# Patient Record
Sex: FEMALE | Race: WHITE
[De-identification: names, ages, dates, MRNs, and addresses within clinical notes are randomized per-mention and may not be internally consistent; named-entity substitution may affect disease eponyms.]

---

## 2019-12-31 ENCOUNTER — HOSPITAL ENCOUNTER (EMERGENCY)
Dept: HOSPITAL 25 - UCEAST | Age: 64
Discharge: HOME | End: 2019-12-31
Payer: COMMERCIAL

## 2019-12-31 VITALS — DIASTOLIC BLOOD PRESSURE: 91 MMHG | SYSTOLIC BLOOD PRESSURE: 144 MMHG

## 2019-12-31 DIAGNOSIS — Z87.891: ICD-10-CM

## 2019-12-31 DIAGNOSIS — L25.0: Primary | ICD-10-CM

## 2019-12-31 PROCEDURE — G0463 HOSPITAL OUTPT CLINIC VISIT: HCPCS

## 2019-12-31 PROCEDURE — 99212 OFFICE O/P EST SF 10 MIN: CPT

## 2019-12-31 NOTE — UC
UC General HPI





- History of Current Complaint


Chief Complaint: UCRash


Stated Complaint: RASH


Hx Obtained From: Patient


Pain Intensity: 4





- Allergy/Home Medications


Allergies/Adverse Reactions: 


 Allergies











Allergy/AdvReac Type Severity Reaction Status Date / Time


 


No Known Allergies Allergy   Verified 12/31/19 08:55














PMH/Surg Hx/FS Hx/Imm Hx





- Surgical History


Surgical History: Yes


Surgery Procedure, Year, and Place: hernia repair.  tonsillectomy





- Family History


Known Family History: Positive: None





- Social History


Alcohol Use: Daily


Substance Use Type: None


Smoking Status (MU): Former Smoker


When Did the Patient Quit Smoking/Using Tobacco: 34 yrs ago





Physical Exam


Vital Signs: 


 Initial Vital Signs











Temp  98 F   12/31/19 08:51


 


Pulse  96   12/31/19 08:51


 


Resp  18   12/31/19 08:51


 


BP  144/91   12/31/19 08:51


 


Pulse Ox  100   12/31/19 08:51














Discharge ED





- Discharge Plan


Referrals: 


Shereen Ramírez MD [Primary Care Provider] -

## 2019-12-31 NOTE — UC
Skin Complaint HPI





- HPI Summary


HPI Summary: 





63 yo female presents with rash. She tells me that for years she has used the 

same deodorant, but it recently went off the market. 4 days ago she tried a new 

deodorant and 2 days later developed a red, burning, itching rash under both of 

her arms. She applied OTC natural oils with no relief. Denies edema, throat 

swelling, sob, wheezing. 





- History of Current Complaint


Chief Complaint: UCRash


Time Seen by Provider: 12/31/19 09:54


Stated Complaint: RASH


Onset/Duration: Sudden Onset


Onset Severity: Mild


Current Severity: Mild


Pain Intensity: 4


Pain Scale Used: 0-10 Numeric





- Allergy/Home Medications


Allergies/Adverse Reactions: 


 Allergies











Allergy/AdvReac Type Severity Reaction Status Date / Time


 


No Known Allergies Allergy   Verified 12/31/19 08:55














PMH/Surg Hx/FS Hx/Imm Hx





- Additional Past Medical History


Additional PMH: 





None





- Surgical History


Surgical History: Yes


Surgery Procedure, Year, and Place: hernia repair.  tonsillectomy





- Family History


Known Family History: Positive: None





- Social History


Lives: With Family


Alcohol Use: Daily


Substance Use Type: None


Smoking Status (MU): Former Smoker


When Did the Patient Quit Smoking/Using Tobacco: 34 yrs ago





Review of Systems


All Other Systems Reviewed And Are Negative: No


Constitutional: Positive: Negative


Skin: Positive: Rash


Eyes: Positive: Negative


ENT: Positive: Negative


Respiratory: Positive: Negative


Cardiovascular: Positive: Negative


Gastrointestinal: Positive: Negative


Neurological: Positive: Negative


Psychological: Positive: Negative





Physical Exam





- Summary


Physical Exam Summary: 





GENERAL: NAD. WDWN. No pain distress.


SKIN: B/L Axilla: mildly erythematous dry appearing irritation. No open wounds, 

drainage, abscess, or streaking. 


NECK: Supple. Nontender. No lymphadenopathy. 


CHEST:  No accessory muscle use. Breathing comfortably and in no distress.


CV:  Pulses intact. Cap refill <2seconds


NEURO: Alert.


PSYCH: Age appropriate behavior.





Triage Information Reviewed: Yes


Vital Signs: 


 Initial Vital Signs











Temp  98 F   12/31/19 08:51


 


Pulse  96   12/31/19 08:51


 


Resp  18   12/31/19 08:51


 


BP  144/91   12/31/19 08:51


 


Pulse Ox  100   12/31/19 08:51











Vital Signs Reviewed: Yes





Course/Dx





- Course


Course Of Treatment: 





Contact dermatitis.





- Diagnoses


Provider Diagnosis: 


 Contact dermatitis








Discharge ED





- Sign-Out/Discharge


Documenting (check all that apply): Patient Departure


All imaging exams completed and their final reports reviewed: No Studies





- Discharge Plan


Condition: Stable


Disposition: HOME


Prescriptions: 


Triamcinolone 0.1% CREAM (NF) [Kenalog 0.1% Cream (NF)] 1 applic TOPICAL BID #1 

tube


Patient Education Materials:  Contact Dermatitis (ED)


Referrals: 


Shereen Ramírez MD [Primary Care Provider] - 


Additional Instructions: 


If you develop a fever, shortness of breath, chest pain, new or worsening 

symptoms - please call your PCP or go to the ED immediately.


 





Your blood pressure was high at todays visit. Please see your primary provider 

within 4 weeks for recheck and re-evaluation.








Use the cream twice a day for 5-7 days. Stop applying all other creams/lotions/

oils





- Billing Disposition and Condition


Condition: STABLE


Disposition: Home